# Patient Record
Sex: FEMALE | Race: WHITE | NOT HISPANIC OR LATINO
[De-identification: names, ages, dates, MRNs, and addresses within clinical notes are randomized per-mention and may not be internally consistent; named-entity substitution may affect disease eponyms.]

---

## 2023-03-02 PROBLEM — Z00.00 ENCOUNTER FOR PREVENTIVE HEALTH EXAMINATION: Status: ACTIVE | Noted: 2023-03-02

## 2023-03-03 ENCOUNTER — APPOINTMENT (OUTPATIENT)
Dept: CARDIOLOGY | Facility: CLINIC | Age: 88
End: 2023-03-03
Payer: MEDICARE

## 2023-03-03 ENCOUNTER — RESULT CHARGE (OUTPATIENT)
Age: 88
End: 2023-03-03

## 2023-03-03 ENCOUNTER — NON-APPOINTMENT (OUTPATIENT)
Age: 88
End: 2023-03-03

## 2023-03-03 VITALS
HEART RATE: 84 BPM | RESPIRATION RATE: 18 BRPM | SYSTOLIC BLOOD PRESSURE: 125 MMHG | DIASTOLIC BLOOD PRESSURE: 75 MMHG | WEIGHT: 140 LBS | OXYGEN SATURATION: 96 % | BODY MASS INDEX: 27.48 KG/M2 | HEIGHT: 60 IN | TEMPERATURE: 96 F

## 2023-03-03 DIAGNOSIS — Z78.9 OTHER SPECIFIED HEALTH STATUS: ICD-10-CM

## 2023-03-03 DIAGNOSIS — Z87.39 PERSONAL HISTORY OF OTHER DISEASES OF THE MUSCULOSKELETAL SYSTEM AND CONNECTIVE TISSUE: ICD-10-CM

## 2023-03-03 DIAGNOSIS — Z86.39 PERSONAL HISTORY OF OTHER ENDOCRINE, NUTRITIONAL AND METABOLIC DISEASE: ICD-10-CM

## 2023-03-03 PROCEDURE — 99214 OFFICE O/P EST MOD 30 MIN: CPT

## 2023-03-03 PROCEDURE — 93000 ELECTROCARDIOGRAM COMPLETE: CPT

## 2023-03-03 RX ORDER — CLOPIDOGREL BISULFATE 75 MG/1
75 TABLET, FILM COATED ORAL DAILY
Refills: 0 | Status: ACTIVE | COMMUNITY

## 2023-03-03 RX ORDER — ROSUVASTATIN CALCIUM 5 MG/1
5 TABLET, FILM COATED ORAL DAILY
Refills: 0 | Status: ACTIVE | COMMUNITY

## 2023-03-03 RX ORDER — ASPIRIN 81 MG
81 TABLET, DELAYED RELEASE (ENTERIC COATED) ORAL
Refills: 0 | Status: ACTIVE | COMMUNITY

## 2023-03-03 RX ORDER — LEVOTHYROXINE SODIUM 50 UG/1
50 TABLET ORAL DAILY
Refills: 0 | Status: ACTIVE | COMMUNITY

## 2023-03-03 NOTE — ASSESSMENT
[FreeTextEntry1] : 86 y/o female with history as above\par Prior records reviewed\par [facility]\par Echo 11/21 - normal LV function, normal function of AV prosthesis\par Cath report noted.\par \par Palpitations\par Obtain Holter\par Increase metoprolol to BID\par If still with elevated BP, start taking amlodipine every day, instead of PRN\par C/w statin\par C/w DAPT for now\par F/u after the Holter.

## 2023-03-03 NOTE — HISTORY OF PRESENT ILLNESS
[FreeTextEntry1] : 86 y/o female with history of HTN, DL, CAD, s/p PCI of LPDA in 2021, (non-dominant RCA disease - managed medically), previously followed by Dr. Ever Angela, s/p AVR ([facility]) in 2016 at [facility], [facility], presenting for evaluation. Patient reports episodes of palpitations and elevated BP in the evenings. No syncope. Has some CP with these episodes. No falls.  207.2 205.2 206.1

## 2023-03-03 NOTE — PHYSICAL EXAM
[Well Developed] : well developed [Well Nourished] : well nourished [No Acute Distress] : no acute distress [Normal Conjunctiva] : normal conjunctiva [Normal Venous Pressure] : normal venous pressure [No Carotid Bruit] : no carotid bruit [Normal S1, S2] : normal S1, S2 [No Rub] : no rub [No Gallop] : no gallop [Clear Lung Fields] : clear lung fields [Good Air Entry] : good air entry [Soft] : abdomen soft [No Respiratory Distress] : no respiratory distress  [Non Tender] : non-tender [No Masses/organomegaly] : no masses/organomegaly [Normal Bowel Sounds] : normal bowel sounds [Normal Gait] : normal gait [No Edema] : no edema [No Cyanosis] : no cyanosis [No Clubbing] : no clubbing [No Varicosities] : no varicosities [No Rash] : no rash [No Skin Lesions] : no skin lesions [Moves all extremities] : moves all extremities [No Focal Deficits] : no focal deficits [Normal Speech] : normal speech [Alert and Oriented] : alert and oriented [Normal memory] : normal memory [de-identified] : 2/6 systolic murmur

## 2023-03-20 ENCOUNTER — APPOINTMENT (OUTPATIENT)
Dept: CARDIOLOGY | Facility: CLINIC | Age: 88
End: 2023-03-20

## 2023-03-20 ENCOUNTER — NON-APPOINTMENT (OUTPATIENT)
Age: 88
End: 2023-03-20

## 2023-03-29 ENCOUNTER — APPOINTMENT (OUTPATIENT)
Dept: CARDIOLOGY | Facility: CLINIC | Age: 88
End: 2023-03-29
Payer: MEDICARE

## 2023-03-29 VITALS
OXYGEN SATURATION: 97 % | SYSTOLIC BLOOD PRESSURE: 140 MMHG | RESPIRATION RATE: 18 BRPM | TEMPERATURE: 97 F | HEART RATE: 82 BPM | WEIGHT: 144 LBS | DIASTOLIC BLOOD PRESSURE: 70 MMHG | HEIGHT: 60 IN | BODY MASS INDEX: 28.27 KG/M2

## 2023-03-29 DIAGNOSIS — R07.9 CHEST PAIN, UNSPECIFIED: ICD-10-CM

## 2023-03-29 PROCEDURE — 99214 OFFICE O/P EST MOD 30 MIN: CPT

## 2023-03-29 PROCEDURE — 93000 ELECTROCARDIOGRAM COMPLETE: CPT

## 2023-03-29 RX ORDER — AMLODIPINE BESYLATE 2.5 MG/1
2.5 TABLET ORAL DAILY
Qty: 90 | Refills: 3 | Status: ACTIVE | COMMUNITY
Start: 1900-01-01 | End: 1900-01-01

## 2023-03-29 RX ORDER — MAGNESIUM OXIDE 241.3 MG/1000MG
400 TABLET ORAL DAILY
Qty: 30 | Refills: 1 | Status: ACTIVE | COMMUNITY
Start: 2023-03-29 | End: 1900-01-01

## 2023-03-29 NOTE — PHYSICAL EXAM
[Well Developed] : well developed [Well Nourished] : well nourished [No Acute Distress] : no acute distress [Normal Conjunctiva] : normal conjunctiva [Normal Venous Pressure] : normal venous pressure [No Carotid Bruit] : no carotid bruit [Normal S1, S2] : normal S1, S2 [No Rub] : no rub [No Gallop] : no gallop [Clear Lung Fields] : clear lung fields [Good Air Entry] : good air entry [No Respiratory Distress] : no respiratory distress  [Soft] : abdomen soft [Non Tender] : non-tender [No Masses/organomegaly] : no masses/organomegaly [Normal Bowel Sounds] : normal bowel sounds [Normal Gait] : normal gait [No Edema] : no edema [No Cyanosis] : no cyanosis [No Clubbing] : no clubbing [No Varicosities] : no varicosities [No Rash] : no rash [No Skin Lesions] : no skin lesions [Moves all extremities] : moves all extremities [No Focal Deficits] : no focal deficits [Normal Speech] : normal speech [Alert and Oriented] : alert and oriented [Normal memory] : normal memory [de-identified] : 2/6 systolic murmur

## 2023-03-29 NOTE — ASSESSMENT
[FreeTextEntry1] : 88 y/o female with history as above\par Prior records reviewed\par Chart from [facility] and [facility] reviewed\par Echo 11/21 - normal LV function, normal function of AV prosthesis\par Cath report noted.\par \par Palpitations\par Results of Holter reviewed, discussed with the patient and family\par Increase metoprolol to TID, but the risk of high dose b-blocker was discussed, given her pauses, suspected tachy-tristen syndrome.\par Refer to EP for evaluation, possible PPM, possible ablation.\par Start taking amlodipine every day, instead of PRN\par C/w statin\par C/w Plavix, but d/c AS for now\par F/u after the EP eval.

## 2023-03-29 NOTE — HISTORY OF PRESENT ILLNESS
[FreeTextEntry1] : 86 y/o female with history of HTN, DL, CAD, s/p PCI of LPDA in 2021, (non-dominant RCA disease - managed medically), previously followed by Dr. Ever Angela, s/p AVR ([facility]as) in 2016 at [facility], [facility], presenting for evaluation. Patient reports episodes of palpitations and elevated BP in the evenings. No syncope. Has some CP with these episodes. No falls. Holter reviewed - has episodes of atrial tachycardia, but no AF. She reports she had a prolonged episode of palpitations after she stopped the Holter, so this was not caught on the device, so question of PAF still remains. Also she had episodes of sinus pauses for 3 seconds. No syncope.

## 2023-05-19 ENCOUNTER — APPOINTMENT (OUTPATIENT)
Dept: CARDIOLOGY | Facility: CLINIC | Age: 88
End: 2023-05-19
Payer: MEDICARE

## 2023-05-19 VITALS
RESPIRATION RATE: 18 BRPM | HEART RATE: 74 BPM | BODY MASS INDEX: 27.48 KG/M2 | OXYGEN SATURATION: 96 % | SYSTOLIC BLOOD PRESSURE: 110 MMHG | TEMPERATURE: 97 F | HEIGHT: 60 IN | WEIGHT: 140 LBS | DIASTOLIC BLOOD PRESSURE: 68 MMHG

## 2023-05-19 PROCEDURE — 93000 ELECTROCARDIOGRAM COMPLETE: CPT

## 2023-05-19 PROCEDURE — 99213 OFFICE O/P EST LOW 20 MIN: CPT

## 2023-05-19 RX ORDER — CILOSTAZOL 50 MG/1
50 TABLET ORAL
Qty: 120 | Refills: 0 | Status: ACTIVE | COMMUNITY
Start: 2023-05-19 | End: 1900-01-01

## 2023-05-23 NOTE — PHYSICAL EXAM
[Well Developed] : well developed [Well Nourished] : well nourished [No Acute Distress] : no acute distress [Normal Conjunctiva] : normal conjunctiva [Normal Venous Pressure] : normal venous pressure [No Carotid Bruit] : no carotid bruit [Normal S1, S2] : normal S1, S2 [No Rub] : no rub [No Gallop] : no gallop [Clear Lung Fields] : clear lung fields [Good Air Entry] : good air entry [No Respiratory Distress] : no respiratory distress  [Soft] : abdomen soft [Non Tender] : non-tender [No Masses/organomegaly] : no masses/organomegaly [Normal Bowel Sounds] : normal bowel sounds [Normal Gait] : normal gait [No Edema] : no edema [No Clubbing] : no clubbing [No Cyanosis] : no cyanosis [No Varicosities] : no varicosities [No Rash] : no rash [No Skin Lesions] : no skin lesions [Moves all extremities] : moves all extremities [No Focal Deficits] : no focal deficits [Normal Speech] : normal speech [Alert and Oriented] : alert and oriented [Normal memory] : normal memory [de-identified] : 2/6 systolic murmur

## 2023-05-23 NOTE — HISTORY OF PRESENT ILLNESS
[FreeTextEntry1] : 88 y/o female with history of HTN, DL, CAD, s/p PCI of LPDA in 2021, (non-dominant RCA disease - managed medically), previously followed by Dr. Ever Angela, s/p AVR ([facility]as) in 2016 at [facility], [facility], presenting for evaluation. Patient reports episodes of palpitations and elevated BP in the evenings. No syncope. Has some CP with these episodes. No falls. Holter reviewed - has episodes of atrial tachycardia, but no AF. She reports she had a prolonged episode of palpitations after she stopped the Holter, so this was not caught on the device, so question of PAF still remains. Also she had episodes of sinus pauses for 3 seconds. No syncope. Was diagnosed with PVD (MRA), b/l high grade stenosis b/l. Also has venous insufficiency.

## 2023-05-23 NOTE — ASSESSMENT
[FreeTextEntry1] : 86 y/o female with history as above\par Prior records reviewed\par Chart from [facility] and [facility] reviewed\par Echo 11/21 - normal LV function, normal function of AV prosthesis\par Cath report noted.\par \par Palpitations:\par \par Results of Holter reviewed, discussed with the patient and family\par Increase metoprolol to TID, but the risk of high dose b-blocker was discussed, given her pauses, suspected tachy-tristen syndrome.\par Refer to EP for evaluation, possible PPM, possible ablation. Will f/u with Dr. Sanchez\par C/w amlodipine every day, instead of PRN\par C/w statin\par C/w Plavix, but d/c AS for now\par F/u after the EP eval.

## 2023-07-07 ENCOUNTER — APPOINTMENT (OUTPATIENT)
Dept: CARDIOLOGY | Facility: CLINIC | Age: 88
End: 2023-07-07
Payer: MEDICARE

## 2023-07-07 VITALS
RESPIRATION RATE: 18 BRPM | BODY MASS INDEX: 27.48 KG/M2 | TEMPERATURE: 97.6 F | DIASTOLIC BLOOD PRESSURE: 80 MMHG | SYSTOLIC BLOOD PRESSURE: 125 MMHG | HEART RATE: 81 BPM | OXYGEN SATURATION: 97 % | HEIGHT: 60 IN | WEIGHT: 140 LBS

## 2023-07-07 PROCEDURE — 99214 OFFICE O/P EST MOD 30 MIN: CPT

## 2023-07-07 PROCEDURE — 93000 ELECTROCARDIOGRAM COMPLETE: CPT

## 2023-07-07 NOTE — PHYSICAL EXAM
[Well Developed] : well developed [Well Nourished] : well nourished [No Acute Distress] : no acute distress [Normal Conjunctiva] : normal conjunctiva [Normal Venous Pressure] : normal venous pressure [No Carotid Bruit] : no carotid bruit [Normal S1, S2] : normal S1, S2 [No Rub] : no rub [No Gallop] : no gallop [Clear Lung Fields] : clear lung fields [Good Air Entry] : good air entry [No Respiratory Distress] : no respiratory distress  [Soft] : abdomen soft [Non Tender] : non-tender [No Masses/organomegaly] : no masses/organomegaly [Normal Bowel Sounds] : normal bowel sounds [Normal Gait] : normal gait [No Edema] : no edema [No Cyanosis] : no cyanosis [No Clubbing] : no clubbing [No Varicosities] : no varicosities [No Rash] : no rash [No Skin Lesions] : no skin lesions [Moves all extremities] : moves all extremities [No Focal Deficits] : no focal deficits [Normal Speech] : normal speech [Alert and Oriented] : alert and oriented [Normal memory] : normal memory [de-identified] : 2/6 systolic murmur

## 2023-07-07 NOTE — HISTORY OF PRESENT ILLNESS
[FreeTextEntry1] : 88 y/o female with history of HTN, DL, CAD, s/p PCI of LPDA in 2021, (non-dominant RCA disease - managed medically), previously followed by Dr. Ever Angela, s/p AVR (yonas) in 2016 at [facility], [facility], presenting for evaluation. Patient reports episodes of palpitations and elevated BP in the evenings. No syncope. Has some CP with these episodes. No falls. Holter reviewed - has episodes of atrial tachycardia, but no AF. She reports she had a prolonged episode of palpitations after she stopped the Holter, so this was not caught on the device, so question of PAF still remains. Also she had episodes of sinus pauses for 3 seconds. No syncope. Was diagnosed with PVD (MRA), b/l high grade stenosis of SFA L>R symptoms. Also has venous insufficiency. Had some improvement with Pletalo, but still quite limited. No further palpitations with increase in b-blocker. No syncope. Was offered ILR by EP, but decided to hold off as she is asymptomatic.

## 2023-07-07 NOTE — ASSESSMENT
[FreeTextEntry1] : 88 y/o female with history as above\par Prior records reviewed\par Chart from [facility] and [facility] reviewed\par Echo 11/21 - normal LV function, normal function of AV prosthesis\par Cath report noted.\par \par Palpitations:\par \par Results of Holter reviewed, discussed with the patient and family\par Increased metoprolol to TID, c/w this dose.\par F/u with EP. She would like to hold off on ILR. She follows with Dr. Sanchez in [place]\par C/w amlodipine every day, instead of PRN\par C/w statin\par C/w Plavix, but d/c ASA, as she is on Pletal.\par \par Refer to Dr. Ramirez for evaluation for PTA, given symptomatic PAD, abnormal MRA results and persistent symptoms on Pletal.

## 2023-08-16 ENCOUNTER — APPOINTMENT (OUTPATIENT)
Dept: CARDIOLOGY | Facility: CLINIC | Age: 88
End: 2023-08-16
Payer: MEDICARE

## 2023-08-16 PROCEDURE — 99213 OFFICE O/P EST LOW 20 MIN: CPT | Mod: 95

## 2023-08-16 NOTE — ASSESSMENT
[FreeTextEntry1] : A telemedicine visit was conducted, using audio and video connection. Patient gave verbal consent to telemedicine visit.  86 y/o female with history as above  Prior records reviewed. Discussed with the patient   Echo 11/21 - normal LV function, normal function of AV prosthesis  Cath report noted.    Palpitations:    Results of Holter reviewed, discussed with the patient and family  Increased metoprolol to TID, c/w this dose.  F/u with EP. She would like to hold off on ILR. She follows with Dr. Sanchez in [place]  C/w amlodipine every day, instead of PRN - agian the use discussed. She will take amlodipine with afternoon metoprolol and losartan with evening metoprolol  C/w statin  C/w Plavix, but d/c ASA, as she is on Pletal.    Referred to Dr. Ramirez for evaluation for PTA, given symptomatic PAD, abnormal MRA results and persistent symptoms on Pletal. She wants to hold off as she felt better in terms of her claudication. If still symptomatic, will refer again.

## 2023-08-16 NOTE — HISTORY OF PRESENT ILLNESS
[FreeTextEntry1] : Televisit 8/16/2023   88 y/o female with history of HTN, DL, CAD, s/p PCI of LPDA in 2021, (non-dominant RCA disease - managed medically), previously followed by Dr. Ever Angela, s/p AVR (yonas) in 2016 at [facility], hypothyroidism, presenting for evaluation. Patient reports episodes of palpitations and elevated BP in the evenings. No syncope. Has some CP with these episodes. No falls. Holter reviewed - has episodes of atrial tachycardia, but no AF. She reports she had a prolonged episode of palpitations after she stopped the Holter, so this was not caught on the device, so question of PAF still remains. Also she had episodes of sinus pauses for 3 seconds. No syncope. Was diagnosed with PVD (MRA), b/l high grade stenosis of SFA L>R symptoms. Also has venous insufficiency. Had some improvement with Pletal, improved from last visit. No further palpitations with increase in b-blocker. No syncope. Was offered ILR by EP, but decided to hold off as she is asymptomatic. She reports episodes of elevated BP in the evening.

## 2023-08-16 NOTE — HISTORY OF PRESENT ILLNESS
[FreeTextEntry1] : Televisit 8/16/2023   86 y/o female with history of HTN, DL, CAD, s/p PCI of LPDA in 2021, (non-dominant RCA disease - managed medically), previously followed by Dr. Ever Angela, s/p AVR (yonas) in 2016 at [facility], hypothyroidism, presenting for evaluation. Patient reports episodes of palpitations and elevated BP in the evenings. No syncope. Has some CP with these episodes. No falls. Holter reviewed - has episodes of atrial tachycardia, but no AF. She reports she had a prolonged episode of palpitations after she stopped the Holter, so this was not caught on the device, so question of PAF still remains. Also she had episodes of sinus pauses for 3 seconds. No syncope. Was diagnosed with PVD (MRA), b/l high grade stenosis of SFA L>R symptoms. Also has venous insufficiency. Had some improvement with Pletal, improved from last visit. No further palpitations with increase in b-blocker. No syncope. Was offered ILR by EP, but decided to hold off as she is asymptomatic. She reports episodes of elevated BP in the evening.

## 2023-08-16 NOTE — ASSESSMENT
[FreeTextEntry1] : A telemedicine visit was conducted, using audio and video connection. Patient gave verbal consent to telemedicine visit.  88 y/o female with history as above  Prior records reviewed. Discussed with the patient   Echo 11/21 - normal LV function, normal function of AV prosthesis  Cath report noted.    Palpitations:    Results of Holter reviewed, discussed with the patient and family  Increased metoprolol to TID, c/w this dose.  F/u with EP. She would like to hold off on ILR. She follows with Dr. Sanchez in [place]  C/w amlodipine every day, instead of PRN - agian the use discussed. She will take amlodipine with afternoon metoprolol and losartan with evening metoprolol  C/w statin  C/w Plavix, but d/c ASA, as she is on Pletal.    Referred to Dr. Ramirez for evaluation for PTA, given symptomatic PAD, abnormal MRA results and persistent symptoms on Pletal. She wants to hold off as she felt better in terms of her claudication. If still symptomatic, will refer again.

## 2023-10-04 ENCOUNTER — APPOINTMENT (OUTPATIENT)
Dept: CARDIOLOGY | Facility: CLINIC | Age: 88
End: 2023-10-04
Payer: MEDICARE

## 2023-10-04 VITALS
BODY MASS INDEX: 27.88 KG/M2 | OXYGEN SATURATION: 96 % | RESPIRATION RATE: 18 BRPM | HEIGHT: 60 IN | WEIGHT: 142 LBS | SYSTOLIC BLOOD PRESSURE: 110 MMHG | DIASTOLIC BLOOD PRESSURE: 65 MMHG | HEART RATE: 76 BPM

## 2023-10-04 DIAGNOSIS — R00.2 PALPITATIONS: ICD-10-CM

## 2023-10-04 PROCEDURE — 99214 OFFICE O/P EST MOD 30 MIN: CPT

## 2023-10-04 PROCEDURE — 93000 ELECTROCARDIOGRAM COMPLETE: CPT

## 2024-03-15 ENCOUNTER — APPOINTMENT (OUTPATIENT)
Dept: CARDIOLOGY | Facility: CLINIC | Age: 89
End: 2024-03-15
Payer: MEDICARE

## 2024-03-15 VITALS
HEIGHT: 60 IN | SYSTOLIC BLOOD PRESSURE: 153 MMHG | OXYGEN SATURATION: 93 % | BODY MASS INDEX: 27.88 KG/M2 | HEART RATE: 68 BPM | RESPIRATION RATE: 18 BRPM | DIASTOLIC BLOOD PRESSURE: 80 MMHG | WEIGHT: 142 LBS | TEMPERATURE: 97 F

## 2024-03-15 DIAGNOSIS — I73.9 PERIPHERAL VASCULAR DISEASE, UNSPECIFIED: ICD-10-CM

## 2024-03-15 PROCEDURE — 99214 OFFICE O/P EST MOD 30 MIN: CPT

## 2024-03-15 PROCEDURE — G2211 COMPLEX E/M VISIT ADD ON: CPT

## 2024-03-15 PROCEDURE — 93000 ELECTROCARDIOGRAM COMPLETE: CPT

## 2024-03-15 NOTE — HISTORY OF PRESENT ILLNESS
[FreeTextEntry1] : 89 y/o female with history of HTN, DL, CAD, s/p PCI of LPDA in 2021, (non-dominant RCA disease - managed medically), previously followed by Dr. Ever Angela, s/p AVR (yonas) in 2016 at [facility], [facility], presenting for evaluation. Patient reports episodes of palpitations and elevated BP in the evenings. No syncope. Has some CP with these episodes. No falls. Holter reviewed - has episodes of atrial tachycardia, but no AF. She reports she had a prolonged episode of palpitations after she stopped the Holter, so this was not caught on the device, so question of PAF still remains. Also she had episodes of sinus pauses for 3 seconds. No syncope. Was diagnosed with PVD (MRA), b/l high grade stenosis of SFA L>R symptoms. Also has venous insufficiency. Had some improvement with Pletal, improved from last visit. No further palpitations with increase in b-blocker. No syncope. Was offered ILR by EP, but decided to hold off as she is asymptomatic. She reports episodes of elevated BP in the evening. No claudication, has knee pain - going to PT. BP is still elevated.

## 2024-03-15 NOTE — ASSESSMENT
[FreeTextEntry1] : 89 y/o female with history as above  Prior records reviewed. Discussed with the patient   Echo 11/21 - normal LV function, normal function of AV prosthesis  Cath report noted.    Palpitations:  Results of Holter reviewed, discussed with the patient and family Increased metoprolol to TID, c/w this dose. F/u with EP. She would like to hold off on ILR. She follows with Dr. Sanchez in [place]  C/w amlodipine every day, instead of PRN - agian the use discussed. D/c losartan and start Benicar-HCT for better control. As she has episodes of LE edema, would prefer not to increase amlodipine. If BP is better controlled, will actually try to wean off Ca blocker.  C/w statin  C/w Plavix, but d/c ASA, as she is on Pletal.   Previously referred to Dr. Ramirez for evaluation for PTA, given symptomatic PAD, abnormal MRA results and persistent symptoms on Pletal. She wants to hold off as she felt better in terms of her claudication. If still symptomatic, will refer again.  Discussed management in detail. F/u in 1 month for BP check.

## 2024-04-17 ENCOUNTER — APPOINTMENT (OUTPATIENT)
Dept: CARDIOLOGY | Facility: CLINIC | Age: 89
End: 2024-04-17
Payer: MEDICARE

## 2024-04-17 VITALS
DIASTOLIC BLOOD PRESSURE: 65 MMHG | HEIGHT: 60 IN | SYSTOLIC BLOOD PRESSURE: 135 MMHG | BODY MASS INDEX: 27.88 KG/M2 | WEIGHT: 142 LBS | RESPIRATION RATE: 18 BRPM | HEART RATE: 66 BPM

## 2024-04-17 DIAGNOSIS — I35.0 NONRHEUMATIC AORTIC (VALVE) STENOSIS: ICD-10-CM

## 2024-04-17 PROCEDURE — G2211 COMPLEX E/M VISIT ADD ON: CPT

## 2024-04-17 PROCEDURE — 99214 OFFICE O/P EST MOD 30 MIN: CPT

## 2024-04-17 PROCEDURE — 93000 ELECTROCARDIOGRAM COMPLETE: CPT

## 2024-04-17 RX ORDER — METOPROLOL SUCCINATE 50 MG/1
50 TABLET, EXTENDED RELEASE ORAL
Qty: 180 | Refills: 3 | Status: ACTIVE | COMMUNITY
Start: 1900-01-01 | End: 1900-01-01

## 2024-04-17 RX ORDER — LOSARTAN POTASSIUM 100 MG/1
100 TABLET, FILM COATED ORAL DAILY
Refills: 0 | Status: COMPLETED | COMMUNITY
End: 2024-04-17

## 2024-04-17 RX ORDER — OLMESARTAN MEDOXOMIL AND HYDROCHLOROTHIAZIDE 40; 25 MG/1; MG/1
40-25 TABLET ORAL DAILY
Qty: 90 | Refills: 1 | Status: ACTIVE | COMMUNITY
Start: 2024-03-15 | End: 1900-01-01

## 2024-04-18 PROBLEM — I35.0 AORTIC STENOSIS, SEVERE: Status: ACTIVE | Noted: 2023-03-03

## 2024-04-18 NOTE — ASSESSMENT
[FreeTextEntry1] : 89 y/o female with history as above Prior records reviewed. Discussed with the patient   Echo 11/21 - normal LV function, normal function of AV prosthesis Cath report noted.   Palpitations:  Results of Holter reviewed, discussed with the patient and family Change metoprolol to 50 BID, c/w this dose. F/u with EP. She would like to hold off on ILR. She follows with Dr. Sanchez in [place] Event monitor to assess the AT  C/w amlodipine every day, instead of PRN - again the use discussed. D/c losartan and start Benicar-HCT for better control. Change to 40/25 for better control. As she has episodes of LE edema, would prefer not to increase amlodipine. If BP is better controlled, will actually try to wean off Ca blocker.  C/w statin  C/w Plavix  Previously referred to Dr. Ramirez for evaluation for PTA, given symptomatic PAD, abnormal MRA results and persistent symptoms on Pletal. She wants to hold off as she felt better in terms of her claudication. If still symptomatic, will refer again.  Discussed management in detail. F/u in 1 month for BP check.

## 2024-04-18 NOTE — HISTORY OF PRESENT ILLNESS
[FreeTextEntry1] : 87 y/o female with history of HTN, DL, CAD, s/p PCI of LPDA in 2021, (non-dominant RCA disease - managed medically), previously followed by Dr. Ever Angela, s/p AVR (yonas) in 2016 at [facility], [facility], presenting for evaluation. Patient reports episodes of palpitations and elevated BP in the evenings. No syncope. Has some CP with these episodes. No falls. Holter reviewed - has episodes of atrial tachycardia, but no AF. She reports she had a prolonged episode of palpitations after she stopped the Holter, so this was not caught on the device, so question of PAF still remains. Also she had episodes of sinus pauses for 3 seconds. No syncope. Was diagnosed with PVD (MRA), b/l high grade stenosis of SFA L>R symptoms. Also has venous insufficiency. Had some improvement with Pletal, improved from last visit. No further palpitations with increase in b-blocker. No syncope. Was offered ILR by EP, but decided to hold off as she is asymptomatic. She reports episodes of elevated BP in the evening. No claudication, has knee pain - going to PT. BP is still elevated at times, although better.

## 2024-05-10 ENCOUNTER — APPOINTMENT (OUTPATIENT)
Dept: CARDIOLOGY | Facility: CLINIC | Age: 89
End: 2024-05-10
Payer: MEDICARE

## 2024-05-10 DIAGNOSIS — I10 ESSENTIAL (PRIMARY) HYPERTENSION: ICD-10-CM

## 2024-05-10 DIAGNOSIS — Z98.61 ATHEROSCLEROTIC HEART DISEASE OF NATIVE CORONARY ARTERY W/OUT ANGINA PECTORIS: ICD-10-CM

## 2024-05-10 DIAGNOSIS — I25.10 ATHEROSCLEROTIC HEART DISEASE OF NATIVE CORONARY ARTERY W/OUT ANGINA PECTORIS: ICD-10-CM

## 2024-05-10 DIAGNOSIS — Z95.2 PRESENCE OF PROSTHETIC HEART VALVE: ICD-10-CM

## 2024-05-10 PROCEDURE — 99213 OFFICE O/P EST LOW 20 MIN: CPT

## 2024-05-10 NOTE — HISTORY OF PRESENT ILLNESS
[FreeTextEntry1] : Televisit:  87 y/o female with history of HTN, DL, CAD, s/p PCI of LPDA in 2021, (non-dominant RCA disease - managed medically), previously followed by Dr. Ever Angela, s/p AVR (yonas) in 2016 at [facility], hypothyroidism, presenting for evaluation. Patient reports episodes of palpitations and elevated BP in the evenings. No syncope. Has some CP with these episodes. No falls. Holter reviewed - has episodes of atrial tachycardia, but no AF. She reports she had a prolonged episode of palpitations after she stopped the Holter, so this was not caught on the device, so question of PAF still remains. Also she had episodes of sinus pauses for 3 seconds. No syncope. Was diagnosed with PVD (MRA), b/l high grade stenosis of SFA L>R symptoms. Also has venous insufficiency. Had some improvement with Pletal, improved from last visit. No further palpitations with increase in b-blocker. No syncope. Was offered ILR by EP, but decided to hold off as she is asymptomatic. She reports episodes of elevated BP in the evening. No claudication, has knee pain - going to PT. BP is still elevated at times, although better. BP has been elevated in the evening to 180's, in AM - she reports 100-110 systolic

## 2024-05-15 ENCOUNTER — APPOINTMENT (OUTPATIENT)
Dept: CARDIOLOGY | Facility: CLINIC | Age: 89
End: 2024-05-15

## 2024-11-20 ENCOUNTER — NON-APPOINTMENT (OUTPATIENT)
Age: 89
End: 2024-11-20

## 2024-11-20 ENCOUNTER — APPOINTMENT (OUTPATIENT)
Dept: CARDIOLOGY | Facility: CLINIC | Age: 89
End: 2024-11-20
Payer: MEDICARE

## 2024-11-20 VITALS
DIASTOLIC BLOOD PRESSURE: 70 MMHG | OXYGEN SATURATION: 96 % | RESPIRATION RATE: 18 BRPM | HEART RATE: 83 BPM | TEMPERATURE: 97.1 F | WEIGHT: 143 LBS | HEIGHT: 60 IN | BODY MASS INDEX: 28.07 KG/M2 | SYSTOLIC BLOOD PRESSURE: 120 MMHG

## 2024-11-20 DIAGNOSIS — I73.9 PERIPHERAL VASCULAR DISEASE, UNSPECIFIED: ICD-10-CM

## 2024-11-20 DIAGNOSIS — I35.0 NONRHEUMATIC AORTIC (VALVE) STENOSIS: ICD-10-CM

## 2024-11-20 DIAGNOSIS — Z98.61 ATHEROSCLEROTIC HEART DISEASE OF NATIVE CORONARY ARTERY W/OUT ANGINA PECTORIS: ICD-10-CM

## 2024-11-20 DIAGNOSIS — I25.10 ATHEROSCLEROTIC HEART DISEASE OF NATIVE CORONARY ARTERY W/OUT ANGINA PECTORIS: ICD-10-CM

## 2024-11-20 DIAGNOSIS — I10 ESSENTIAL (PRIMARY) HYPERTENSION: ICD-10-CM

## 2024-11-20 PROCEDURE — G2211 COMPLEX E/M VISIT ADD ON: CPT

## 2024-11-20 PROCEDURE — 99214 OFFICE O/P EST MOD 30 MIN: CPT

## 2024-11-20 PROCEDURE — 93000 ELECTROCARDIOGRAM COMPLETE: CPT

## 2024-11-20 RX ORDER — DICLOFENAC SODIUM 10 MG/G
1 GEL TOPICAL DAILY
Qty: 1 | Refills: 1 | Status: ACTIVE | COMMUNITY
Start: 2024-11-20 | End: 1900-01-01

## 2024-12-06 ENCOUNTER — APPOINTMENT (OUTPATIENT)
Dept: CARDIOLOGY | Facility: CLINIC | Age: 89
End: 2024-12-06
Payer: MEDICARE

## 2024-12-06 DIAGNOSIS — I35.0 NONRHEUMATIC AORTIC (VALVE) STENOSIS: ICD-10-CM

## 2024-12-06 DIAGNOSIS — Z95.2 PRESENCE OF PROSTHETIC HEART VALVE: ICD-10-CM

## 2024-12-06 DIAGNOSIS — I25.10 ATHEROSCLEROTIC HEART DISEASE OF NATIVE CORONARY ARTERY W/OUT ANGINA PECTORIS: ICD-10-CM

## 2024-12-06 DIAGNOSIS — R00.2 PALPITATIONS: ICD-10-CM

## 2024-12-06 DIAGNOSIS — Z98.61 ATHEROSCLEROTIC HEART DISEASE OF NATIVE CORONARY ARTERY W/OUT ANGINA PECTORIS: ICD-10-CM

## 2024-12-06 DIAGNOSIS — I10 ESSENTIAL (PRIMARY) HYPERTENSION: ICD-10-CM

## 2024-12-06 PROCEDURE — 99214 OFFICE O/P EST MOD 30 MIN: CPT | Mod: 25

## 2024-12-06 PROCEDURE — 93306 TTE W/DOPPLER COMPLETE: CPT

## 2025-06-06 ENCOUNTER — NON-APPOINTMENT (OUTPATIENT)
Age: 89
End: 2025-06-06

## 2025-06-06 ENCOUNTER — APPOINTMENT (OUTPATIENT)
Dept: CARDIOLOGY | Facility: CLINIC | Age: 89
End: 2025-06-06
Payer: MEDICARE

## 2025-06-06 VITALS
OXYGEN SATURATION: 97 % | DIASTOLIC BLOOD PRESSURE: 70 MMHG | HEIGHT: 60 IN | BODY MASS INDEX: 28.27 KG/M2 | SYSTOLIC BLOOD PRESSURE: 130 MMHG | HEART RATE: 63 BPM | RESPIRATION RATE: 18 BRPM | WEIGHT: 144 LBS | TEMPERATURE: 97 F

## 2025-06-06 PROCEDURE — G2211 COMPLEX E/M VISIT ADD ON: CPT

## 2025-06-06 PROCEDURE — 99214 OFFICE O/P EST MOD 30 MIN: CPT

## 2025-06-06 PROCEDURE — 93000 ELECTROCARDIOGRAM COMPLETE: CPT
